# Patient Record
Sex: MALE | Race: ASIAN
[De-identification: names, ages, dates, MRNs, and addresses within clinical notes are randomized per-mention and may not be internally consistent; named-entity substitution may affect disease eponyms.]

---

## 2022-08-10 ENCOUNTER — HOSPITAL ENCOUNTER (EMERGENCY)
Dept: HOSPITAL 4 - SED | Age: 57
Discharge: ANOTHER HOSPITAL | End: 2022-08-10
Payer: MEDICAID

## 2022-08-10 VITALS — SYSTOLIC BLOOD PRESSURE: 150 MMHG

## 2022-08-10 VITALS — SYSTOLIC BLOOD PRESSURE: 141 MMHG

## 2022-08-10 VITALS — WEIGHT: 150 LBS | HEIGHT: 63 IN | BODY MASS INDEX: 26.58 KG/M2
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
## 2022-08-10 DIAGNOSIS — Z79.899: ICD-10-CM

## 2022-08-10 DIAGNOSIS — S42.302A: Primary | ICD-10-CM
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
## 2022-08-10 DIAGNOSIS — R55: ICD-10-CM

## 2022-08-10 DIAGNOSIS — Z20.822: ICD-10-CM
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
## 2022-08-10 LAB
ALBUMIN SERPL BCP-MCNC: 4 G/DL (ref 3.4–4.8)
ALT SERPL W P-5'-P-CCNC: 35 U/L (ref 12–78)
ANION GAP SERPL CALCULATED.3IONS-SCNC: 6 MMOL/L (ref 5–15)
AST SERPL W P-5'-P-CCNC: 26 U/L (ref 10–37)
BASOPHILS # BLD AUTO: 0.1 K/UL (ref 0–0.2)
BASOPHILS NFR BLD AUTO: 0.8 % (ref 0–2)
BILIRUB SERPL-MCNC: 0.5 MG/DL (ref 0–1)
BUN SERPL-MCNC: 21 MG/DL (ref 8–21)
CALCIUM SERPL-MCNC: 9.1 MG/DL (ref 8.4–11)
CHLORIDE SERPL-SCNC: 104 MMOL/L (ref 98–107)
CREAT SERPL-MCNC: 1.31 MG/DL (ref 0.55–1.3)
EOSINOPHIL # BLD AUTO: 0.2 K/UL (ref 0–0.4)
EOSINOPHIL NFR BLD AUTO: 2.6 % (ref 0–4)
ERYTHROCYTE [DISTWIDTH] IN BLOOD BY AUTOMATED COUNT: 13.9 % (ref 9–15)
GFR SERPL CREATININE-BSD FRML MDRD: 72 ML/MIN (ref 90–?)
GLUCOSE SERPL-MCNC: 127 MG/DL (ref 70–99)
HCT VFR BLD AUTO: 36.2 % (ref 36–54)
HGB BLD-MCNC: 12.6 G/DL (ref 14–18)
INR PPP: 1 (ref 0.8–1.2)
LYMPHOCYTES # BLD AUTO: 1.7 K/UL (ref 1–5.5)
LYMPHOCYTES NFR BLD AUTO: 24.6 % (ref 20.5–51.5)
MCH RBC QN AUTO: 31 PG (ref 27–31)
MCHC RBC AUTO-ENTMCNC: 35 % (ref 32–36)
MCV RBC AUTO: 90 FL (ref 79–98)
MONOCYTES # BLD MANUAL: 0.3 K/UL (ref 0–1)
MONOCYTES # BLD MANUAL: 4.6 % (ref 1.7–9.3)
NEUTROPHILS # BLD AUTO: 4.7 K/UL (ref 1.8–7.7)
NEUTROPHILS NFR BLD AUTO: 67.4 % (ref 40–70)
PLATELET # BLD AUTO: 186 K/UL (ref 130–430)
POTASSIUM SERPL-SCNC: 3.9 MMOL/L (ref 3.5–5.1)
PROTHROMBIN TIME: 10 SECS (ref 9.5–12.5)
RBC # BLD AUTO: 4.04 MIL/UL (ref 4.2–6.2)
SODIUM SERPLBLD-SCNC: 138 MMOL/L (ref 136–145)
WBC # BLD AUTO: 7 K/UL (ref 4.8–10.8)

## 2022-08-10 PROCEDURE — 83605 ASSAY OF LACTIC ACID: CPT

## 2022-08-10 PROCEDURE — 70450 CT HEAD/BRAIN W/O DYE: CPT

## 2022-08-10 PROCEDURE — 85730 THROMBOPLASTIN TIME PARTIAL: CPT

## 2022-08-10 PROCEDURE — 82550 ASSAY OF CK (CPK): CPT

## 2022-08-10 PROCEDURE — 71045 X-RAY EXAM CHEST 1 VIEW: CPT

## 2022-08-10 PROCEDURE — 73060 X-RAY EXAM OF HUMERUS: CPT

## 2022-08-10 PROCEDURE — 96372 THER/PROPH/DIAG INJ SC/IM: CPT

## 2022-08-10 PROCEDURE — 93005 ELECTROCARDIOGRAM TRACING: CPT

## 2022-08-10 PROCEDURE — 90471 IMMUNIZATION ADMIN: CPT

## 2022-08-10 PROCEDURE — 82962 GLUCOSE BLOOD TEST: CPT

## 2022-08-10 PROCEDURE — 29105 APPLICATION LONG ARM SPLINT: CPT

## 2022-08-10 PROCEDURE — 85610 PROTHROMBIN TIME: CPT

## 2022-08-10 PROCEDURE — 87426 SARSCOV CORONAVIRUS AG IA: CPT

## 2022-08-10 PROCEDURE — 70486 CT MAXILLOFACIAL W/O DYE: CPT

## 2022-08-10 PROCEDURE — 90715 TDAP VACCINE 7 YRS/> IM: CPT

## 2022-08-10 PROCEDURE — 85025 COMPLETE CBC W/AUTO DIFF WBC: CPT

## 2022-08-10 PROCEDURE — 80053 COMPREHEN METABOLIC PANEL: CPT

## 2022-08-10 PROCEDURE — 99285 EMERGENCY DEPT VISIT HI MDM: CPT

## 2022-08-10 PROCEDURE — 36415 COLL VENOUS BLD VENIPUNCTURE: CPT

## 2022-08-10 PROCEDURE — 84484 ASSAY OF TROPONIN QUANT: CPT

## 2022-08-10 PROCEDURE — 12013 RPR F/E/E/N/L/M 2.6-5.0 CM: CPT

## 2022-08-10 PROCEDURE — 76376 3D RENDER W/INTRP POSTPROCES: CPT
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
## 2022-08-10 NOTE — NUR
RECVD REPORT FROM NOC RN. RESUMPTION OF CARE. PT AAO X 3. FORGETFUL TO EXACT 
DATE AND SITUATION. PT DOES NOT RECALL WHY HE IS HERE OR WHAT HAPPENED. 
PRESENTS WITH NOTABLE SWELLING TO LEFT UPPER LATERAL ARM, PERIORBITAL SWELLING 
NOTED TO RIGHT EYE. RIGHT EYE SWOLLEN SHUT. LACERATION NOTED ABOVE RIGHT EYE. 
PT NOW BACK FROM CT. VSS, AFEBRILE. NAD NOTED. RESTING QUIETLY IN GURNEY. 
PENDING PHYSICIAN ORDERS.

## 2022-08-10 NOTE — NUR
NOTIFIED ED ADMITTING REGARDING DR. AGUILERA'S REQUEST FOR ADMISSION/TRANSFER. PER 
DR. AGUILERA, PT IS STABLE FOR TRANSFER. 



PER ED ADMITTING, FLAKITO, WE DO NOT HAVE A NUMBER TO CALL [facility] REGARDING 
THIS MATTER.



PER FACESHEET:

[facility]
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
## 2022-08-10 NOTE — NUR
[facility]  TO TRANSFER PT TO [place] [facility] UNDER DR. PFEIFFER. REPORT 
CALLED TO KADE[facility] FUENTES. VSS, AFEBRILE. ALL BELONGINGS SENT WITH PT. NAD NOTED. IV 
ACCESS#22G TO RIGHT FOREARM INSERTED FOR TRANSFER. CONDITION STABLE FOR 
TRANSFER.

## 2024-08-09 ENCOUNTER — HOSPITAL ENCOUNTER (EMERGENCY)
Dept: HOSPITAL 4 - SED | Age: 59
Discharge: HOME | End: 2024-08-09
Payer: MEDICAID

## 2024-08-09 VITALS — HEIGHT: 65 IN | WEIGHT: 150 LBS | BODY MASS INDEX: 24.99 KG/M2

## 2024-08-09 VITALS
SYSTOLIC BLOOD PRESSURE: 124 MMHG | TEMPERATURE: 97.4 F | RESPIRATION RATE: 16 BRPM | OXYGEN SATURATION: 96 % | HEART RATE: 72 BPM
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
## 2024-08-09 DIAGNOSIS — S00.81XA: Primary | ICD-10-CM

## 2024-08-09 DIAGNOSIS — I10: ICD-10-CM
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
## 2024-08-09 DIAGNOSIS — E11.9: ICD-10-CM
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
